# Patient Record
Sex: FEMALE | Race: WHITE | NOT HISPANIC OR LATINO | Employment: OTHER | URBAN - METROPOLITAN AREA
[De-identification: names, ages, dates, MRNs, and addresses within clinical notes are randomized per-mention and may not be internally consistent; named-entity substitution may affect disease eponyms.]

---

## 2018-02-17 ENCOUNTER — HOSPITAL ENCOUNTER (EMERGENCY)
Facility: CLINIC | Age: 48
Discharge: HOME | End: 2018-02-17
Attending: EMERGENCY MEDICINE | Admitting: EMERGENCY MEDICINE
Payer: COMMERCIAL

## 2018-02-17 VITALS
BODY MASS INDEX: 25.71 KG/M2 | TEMPERATURE: 98.1 F | HEIGHT: 65 IN | HEART RATE: 88 BPM | RESPIRATION RATE: 14 BRPM | OXYGEN SATURATION: 98 % | SYSTOLIC BLOOD PRESSURE: 152 MMHG | WEIGHT: 154.32 LBS | DIASTOLIC BLOOD PRESSURE: 91 MMHG

## 2018-02-17 VITALS
HEART RATE: 98 BPM | RESPIRATION RATE: 16 BRPM | HEIGHT: 65 IN | TEMPERATURE: 98.3 F | OXYGEN SATURATION: 99 % | BODY MASS INDEX: 25.83 KG/M2 | DIASTOLIC BLOOD PRESSURE: 97 MMHG | SYSTOLIC BLOOD PRESSURE: 143 MMHG | WEIGHT: 155 LBS

## 2018-02-17 DIAGNOSIS — T15.01XD CORNEAL FOREIGN BODY WITH RESIDUAL MATERIAL, RIGHT, SUBSEQUENT ENCOUNTER: ICD-10-CM

## 2018-02-17 DIAGNOSIS — T15.01XS: ICD-10-CM

## 2018-02-17 PROCEDURE — 25000125 ZZHC RX 250: Performed by: EMERGENCY MEDICINE

## 2018-02-17 PROCEDURE — 99283 EMERGENCY DEPT VISIT LOW MDM: CPT | Mod: 27

## 2018-02-17 PROCEDURE — 25000132 ZZH RX MED GY IP 250 OP 250 PS 637: Performed by: EMERGENCY MEDICINE

## 2018-02-17 PROCEDURE — 99283 EMERGENCY DEPT VISIT LOW MDM: CPT

## 2018-02-17 PROCEDURE — 25000125 ZZHC RX 250

## 2018-02-17 RX ORDER — PROPARACAINE HYDROCHLORIDE 5 MG/ML
SOLUTION/ DROPS OPHTHALMIC
Status: COMPLETED
Start: 2018-02-17 | End: 2018-02-17

## 2018-02-17 RX ORDER — OXYCODONE AND ACETAMINOPHEN 5; 325 MG/1; MG/1
1 TABLET ORAL ONCE
Status: COMPLETED | OUTPATIENT
Start: 2018-02-17 | End: 2018-02-17

## 2018-02-17 RX ORDER — PROPARACAINE HYDROCHLORIDE 5 MG/ML
1 SOLUTION/ DROPS OPHTHALMIC ONCE
Status: COMPLETED | OUTPATIENT
Start: 2018-02-17 | End: 2018-02-17

## 2018-02-17 RX ORDER — OXYCODONE AND ACETAMINOPHEN 5; 325 MG/1; MG/1
1-2 TABLET ORAL EVERY 4 HOURS PRN
Qty: 20 TABLET | Refills: 0 | Status: SHIPPED | OUTPATIENT
Start: 2018-02-17

## 2018-02-17 RX ORDER — DICLOFENAC SODIUM 1 MG/ML
SOLUTION/ DROPS OPHTHALMIC
Qty: 10 ML | Refills: 0 | Status: SHIPPED | OUTPATIENT
Start: 2018-02-17

## 2018-02-17 RX ORDER — NEOMYCIN SULFATE, POLYMYXIN B SULFATE AND DEXAMETHASONE 3.5; 10000; 1 MG/ML; [USP'U]/ML; MG/ML
1 SUSPENSION/ DROPS OPHTHALMIC 4 TIMES DAILY
Qty: 5 ML | Refills: 0 | Status: SHIPPED | OUTPATIENT
Start: 2018-02-17 | End: 2018-02-22

## 2018-02-17 RX ORDER — HYDROCODONE BITARTRATE AND ACETAMINOPHEN 5; 325 MG/1; MG/1
1 TABLET ORAL EVERY 8 HOURS PRN
Qty: 12 TABLET | Refills: 0 | Status: SHIPPED | OUTPATIENT
Start: 2018-02-17

## 2018-02-17 RX ORDER — PROPARACAINE HYDROCHLORIDE 5 MG/ML
2 SOLUTION/ DROPS OPHTHALMIC ONCE
Status: COMPLETED | OUTPATIENT
Start: 2018-02-17 | End: 2018-02-17

## 2018-02-17 RX ADMIN — PROPARACAINE HYDROCHLORIDE 1 DROP: 5 SOLUTION/ DROPS OPHTHALMIC at 17:38

## 2018-02-17 RX ADMIN — OXYCODONE HYDROCHLORIDE AND ACETAMINOPHEN 1 TABLET: 5; 325 TABLET ORAL at 17:38

## 2018-02-17 RX ADMIN — PROPARACAINE HYDROCHLORIDE 2 DROP: 5 SOLUTION/ DROPS OPHTHALMIC at 12:43

## 2018-02-17 ASSESSMENT — ENCOUNTER SYMPTOMS
EYE DISCHARGE: 1
EYE ITCHING: 1
EYE PAIN: 1
PHOTOPHOBIA: 1
EYE PAIN: 1

## 2018-02-17 NOTE — DISCHARGE INSTRUCTIONS
Foreign Object in the Cornea    Your cornea is the clear layer on the front of your eyeball. It focuses light and helps protect your eye from dust and germs. A foreign object can get into the cornea itself. A trapped speck of dirt or grit is often a minor problem. But anything metal, or an object that goes through (pierces) your cornea, can cause severe damage. For example, the cornea can be damages from foreign bodies that occur while grinding metal. The small pieces of metal travel towards the eye at high speed.  When to go to the emergency room (ER)  The longer you wait, the greater the chance of injury or infection. Seek emergency medical help right away for any of the following:    An object in your eye that you can't flush out with water    Your eye remains very swollen or painful after an object has been removed    An object embedded in your eye--cover both eyes with a sterile compress and call 911    The front of your eye (cornea) is white or hazy    Blood in your eye (hyphema)    You are having trouble seeing  What to expect in the ER    A healthcare provider will ask about your injury and examine your eye.    You may be given eye drops to ease any mild pain.    The provider will use a microscope with a bright light to help examine your eyeball. He or she may put a special dye (fluorescein dye) on the cornea to help see the object more clearly.    The provider may remove a loose foreign object.    Severe injuries are likely to be treated by an eye specialist (ophthalmologist).    Antibiotic eye drops and possibly pain medicine will be prescribed if you are discharged home  Follow-up  Call your healthcare provider if you notice any of these symptoms after going home:    Fever of 100.4 F (38 C) or higher, or as directed by your healthcare provider    Increased redness or eye pain    Drainage from your eye    Blurred or decreased vision  Date Last Reviewed: 5/1/2017 2000-2017 [employer]  [address] [place] [address]. All rights reserved. This information is not intended as a substitute for professional medical care. Always follow your healthcare professional's instructions.

## 2018-02-17 NOTE — ED AVS SNAPSHOT
Emergency Department    [address] [place] [address]    Phone:  [phone]    Fax:  [phone]                                       Naz Salazar   MRN: [id number]    Department:   Emergency Department   Date of Visit:  2/17/2018           After Visit Summary Signature Page     I have received my discharge instructions, and my questions have been answered. I have discussed any challenges I see with this plan with the nurse or doctor.    ..........................................................................................................................................  Patient/Patient Representative Signature      ..........................................................................................................................................  Patient Representative Print Name and Relationship to Patient    ..................................................               ................................................  Date                                            Time    ..........................................................................................................................................  Reviewed by Signature/Title    ...................................................              ..............................................  Date                                                            Time

## 2018-02-17 NOTE — ED AVS SNAPSHOT
Emergency Department    [address] [facility] [address]    Phone:  [phone]    Fax:  [phone]                                       Naz Salazar   MRN: [id number]    Department:   Emergency Department   Date of Visit:  2/17/2018           Patient Information     Date Of Birth          1970        Your diagnoses for this visit were:     Corneal foreign body with residual material, right, subsequent encounter        You were seen by Idania Rasheed MD.      Follow-up Information     Follow up with Follow-up with the Eye doctor as previously arranged.        Follow up with  Emergency Department.    Specialty:  EMERGENCY MEDICINE    Why:  As needed    Contact information:    [address] [facility] [address] [phone]        Discharge Instructions         Foreign Object in the Cornea    Your cornea is the clear layer on the front of your eyeball. It focuses light and helps protect your eye from dust and germs. A foreign object can get into the cornea itself. A trapped speck of dirt or grit is often a minor problem. But anything metal, or an object that goes through (pierces) your cornea, can cause severe damage. For example, the cornea can be damages from foreign bodies that occur while grinding metal. The small pieces of metal travel towards the eye at high speed.  When to go to the emergency room (ER)  The longer you wait, the greater the chance of injury or infection. Seek emergency medical help right away for any of the following:    An object in your eye that you can't flush out with water    Your eye remains very swollen or painful after an object has been removed    An object embedded in your eye--cover both eyes with a sterile compress and call 911    The front of your eye (cornea) is white or hazy    Blood in your eye (hyphema)    You are having trouble seeing  What to expect in the ER    A healthcare provider will ask about your injury  and examine your eye.    You may be given eye drops to ease any mild pain.    The provider will use a microscope with a bright light to help examine your eyeball. He or she may put a special dye (fluorescein dye) on the cornea to help see the object more clearly.    The provider may remove a loose foreign object.    Severe injuries are likely to be treated by an eye specialist (ophthalmologist).    Antibiotic eye drops and possibly pain medicine will be prescribed if you are discharged home  Follow-up  Call your healthcare provider if you notice any of these symptoms after going home:    Fever of 100.4 F (38 C) or higher, or as directed by your healthcare provider    Significant increase in redness or eye pain    Thick white drainage from your eye    Blurred or decreasing vision compared to your visits today  Date Last Reviewed: 5/1/2017 2000-2017 [employer]. [address] [place] [address]. All rights reserved. This information is not intended as a substitute for professional medical care. Always follow your healthcare professional's instructions.          24 Hour Appointment Hotline       To make an appointment at any [facility], call [phone] ([phone]). If you don't have a family doctor or clinic, we will help you find one. [facility] are conveniently located to serve the needs of you and your family.             Review of your medicines      START taking        Dose / Directions Last dose taken    oxyCODONE-acetaminophen 5-325 MG per tablet   Commonly known as:  PERCOCET   Dose:  1-2 tablet   Quantity:  20 tablet        Take 1-2 tablets by mouth every 4 hours as needed for pain   Refills:  0          Our records show that you are taking the medicines listed below. If these are incorrect, please call your family doctor or clinic.        Dose / Directions Last dose taken    artificial tears Oint ophthalmic ointment   Dose:  1 inch   Quantity:  10 g        Place 1  g into the right eye 3 times daily for 5 days   Refills:  0        diclofenac 0.1 % ophthalmic solution   Commonly known as:  VOLTAREN   Quantity:  10 mL        Use one drop in affected eye up to four times a day   Refills:  0        HYDROcodone-acetaminophen 5-325 MG per tablet   Commonly known as:  NORCO   Dose:  1 tablet   Quantity:  12 tablet        Take 1 tablet by mouth every 8 hours as needed for moderate to severe pain   Refills:  0        neomycin-polymyxin-dexamethasone 3.5-76193-7.1 Susp ophthalmic susp   Commonly known as:  MAXITROL   Dose:  1 drop   Quantity:  5 mL        Place 1 drop into the right eye 4 times daily for 5 days   Refills:  0                Prescriptions were sent or printed at these locations (1 Prescription)                   Other Prescriptions                Printed at Department/Unit printer (1 of 1)         oxyCODONE-acetaminophen (PERCOCET) 5-325 MG per tablet                Orders Needing Specimen Collection     None      Pending Results     No orders found from 2/15/2018 to 2/18/2018.            Pending Culture Results     No orders found from 2/15/2018 to 2/18/2018.            Pending Results Instructions     If you had any lab results that were not finalized at the time of your Discharge, you can call the ED Lab Result RN at [phone]. You will be contacted by this team for any positive Lab results or changes in treatment. The nurses are available 7 days a week from 10A to 6:30P.  You can leave a message 24 hours per day and they will return your call.        Test Results From Your Hospital Stay               Clinical Quality Measure: Blood Pressure Screening     Your blood pressure was checked while you were in the emergency department today. The last reading we obtained was  BP: (!) 152/91 . Please read the guidelines below about what these numbers mean and what you should do about them.  If your systolic blood pressure (the top number) is less than 120 and your diastolic  "blood pressure (the bottom number) is less than 80, then your blood pressure is normal. There is nothing more that you need to do about it.  If your systolic blood pressure (the top number) is 120-139 or your diastolic blood pressure (the bottom number) is 80-89, your blood pressure may be higher than it should be. You should have your blood pressure rechecked within a year by a primary care provider.  If your systolic blood pressure (the top number) is 140 or greater or your diastolic blood pressure (the bottom number) is 90 or greater, you may have high blood pressure. High blood pressure is treatable, but if left untreated over time it can put you at risk for heart attack, stroke, or kidney failure. You should have your blood pressure rechecked by a primary care provider within the next 4 weeks.  If your provider in the emergency department today gave you specific instructions to follow-up with your doctor or provider even sooner than that, you should follow that instruction and not wait for up to 4 weeks for your follow-up visit.        Thank you for choosing [facility]       Thank you for choosing [facility] for your care. Our goal is always to provide you with excellent care. Hearing back from our patients is one way we can continue to improve our services. Please take a few minutes to complete the written survey that you may receive in the mail after you visit with us. Thank you!        [employer] Information     [employer] lets you send messages to your doctor, view your test results, renew your prescriptions, schedule appointments and more. To sign up, go to [email or web address] . Click on \"Log in\" on the left side of the screen, which will take you to the Welcome page. Then click on \"Sign up Now\" on the right side of the page.     You will be asked to enter the access code listed below, as well as some personal information. Please follow the directions to create your username and password.     Your access code " is: [email or web address]  Expires: 2018  2:10 PM     Your access code will  in 90 days. If you need help or a new code, please call your [facility] clinic or [phone].        Care EveryWhere ID     This is your Care EveryWhere ID. This could be used by other organizations to access your [facility] medical records  [id number]        Equal Access to Services     [facility] KONSTANTIN : Hadii liliana regano Sojade, waaxda luqadaha, qaybta kaalmada adeegyada, farheen gimenez . So [facility] [phone].    ATENCIÓN: Si habla rosyañol, tiene a adorno disposición servicios gratuitos de asistencia lingüística. Llame al [phone].    We comply with applicable federal civil rights laws and [place] laws. We do not discriminate on the basis of race, color, national origin, age, disability, sex, sexual orientation, or gender identity.            After Visit Summary       This is your record. Keep this with you and show to your community pharmacist(s) and doctor(s) at your next visit.

## 2018-02-17 NOTE — ED PROVIDER NOTES
"  History     Chief Complaint:  Eye Problem     HPI   Naz Salazar is a 47 year old female who presents to the emergency department today for evaluation of an eye problem. Per chart review, the patient was seen and evaluated in the ED a few hours ago after accidentally getting eyelash glue in her right eye. While in the ED, a good amount of glue was removed from the eye, but there was still some left in the cornea. The patient was discharged to home with prescription for norco, abx gtts, Voltaren, and artificial tears and recommendation to follow up with opthalmology. Of note she does not wear contacts. However, her eye pain has been excruciating and can \"feel something move in there,\" therefore, prompting the return to the ED for further evaluation. Patient describes it as a \"shard of glass\" in her eye and is unable to open her right eye. When she opens her eye, she is photophobic. The patient has no other concerns at this time.     Allergies:  Sulfa Drugs     Medications:    artificial tears OINT ophthalmic ointment  diclofenac (VOLTAREN) 0.1 % ophthalmic solution  neomycin-polymyxin-dexamethasone (MAXITROL) 3.5-77357-0.1 SUSP ophthalmic susp  HYDROcodone-acetaminophen (NORCO) 5-325 MG per tablet    Past Medical History:    History reviewed. No pertinent past medical history.    Past Surgical History:   History reviewed. No pertinent surgical history.    Family History:    History reviewed. No pertinent family history.     Social History:  The patient was accompanied to the ED by her .  Smoking Status: Current  Smokeless Tobacco: Never  Alcohol Use: Yes  Marital Status:        Review of Systems   Eyes: Positive for photophobia and pain (right).   All other systems reviewed and are negative.    Physical Exam     Patient Vitals for the past 24 hrs:   BP Temp Temp src Pulse Heart Rate Resp SpO2 Height Weight   02/17/18 1705 (!) 152/91 98.1  F (36.7  C) Oral 88 88 14 98 % 1.651 m (5' 5\") 70 kg (154 " lb 5.2 oz)       Physical Exam  General/Appearance: appears stated age, well-groomed, appears very uncomfortable and holding R eye closed with lots of tearing from R eye  Eyes: EOMI, diffuse R scleral injection and tearing, 1cm ulcer vs FB covering majority of R iris with + fluorscein uptake but without other areas of fluorescein uptake, no FB on lid eversion, PERRL, no photophobia once proparacaine in eye  ENT: MMM  Neck: supple, nl ROM, no stiffness  Skin: warm and well-perfused, no rash, no edema, no ecchymosis, nl turgor  Neuro: GCS 15, alert and oriented, no gross focal neuro deficits    Emergency Department Course     Interventions:  1738 Proparacaine 1 Drop Right Eye  1738 Percocet 5-325mg 1 Tablet PO    Emergency Department Course:  Nursing notes and vitals reviewed.  1720: I performed an exam of the patient as documented above.   Findings and plan explained to the Patient and spouse. Patient discharged home with instructions regarding supportive care, medications, and reasons to return. The importance of close follow-up was reviewed. The patient was prescribed percocet.     Impression & Plan      Medical Decision Making:  This patient is a 47-year-old female who is re-presenting today for continued and increasing pain to her right eye.  She had last clue in her eye earlier today.  Part of it was removed but clinically she still has either a large amount overlying the majority of her iris are also corneal ulceration.  She did endorse photophobia however once I was actually numbed this was minimal.  I do not think the cycloplegics would help significantly.  She also complained of a foreign body sensation.  I definitely expect that the glue is creating scraping on the eyelid, thus creating a moving foreign body sensation.  Reflow are seen stained with eyelid eversion was unable to identify foreign body.  I will add an increased dose of narcotics.  She already was given information for ophthalmology  follow-up.    Diagnosis:    ICD-10-CM    1. Corneal foreign body with residual material, right, subsequent encounter T15.01XD      Disposition:  Discharged to home    Discharge Medications:  New Prescriptions    OXYCODONE-ACETAMINOPHEN (PERCOCET) 5-325 MG PER TABLET    Take 1-2 tablets by mouth every 4 hours as needed for pain       Scribe Disclosure:  I, Shelli Lobo, am serving as a scribe at 5:17 PM on 2/17/2018 to document services personally performed by Idania Rasheed MD based on my observations and the provider's statements to me.     2/17/2018    EMERGENCY DEPARTMENT       Idania Rasheed MD  02/17/18 2004

## 2018-02-17 NOTE — DISCHARGE INSTRUCTIONS
Foreign Object in the Cornea    Your cornea is the clear layer on the front of your eyeball. It focuses light and helps protect your eye from dust and germs. A foreign object can get into the cornea itself. A trapped speck of dirt or grit is often a minor problem. But anything metal, or an object that goes through (pierces) your cornea, can cause severe damage. For example, the cornea can be damages from foreign bodies that occur while grinding metal. The small pieces of metal travel towards the eye at high speed.  When to go to the emergency room (ER)  The longer you wait, the greater the chance of injury or infection. Seek emergency medical help right away for any of the following:    An object in your eye that you can't flush out with water    Your eye remains very swollen or painful after an object has been removed    An object embedded in your eye--cover both eyes with a sterile compress and call 911    The front of your eye (cornea) is white or hazy    Blood in your eye (hyphema)    You are having trouble seeing  What to expect in the ER    A healthcare provider will ask about your injury and examine your eye.    You may be given eye drops to ease any mild pain.    The provider will use a microscope with a bright light to help examine your eyeball. He or she may put a special dye (fluorescein dye) on the cornea to help see the object more clearly.    The provider may remove a loose foreign object.    Severe injuries are likely to be treated by an eye specialist (ophthalmologist).    Antibiotic eye drops and possibly pain medicine will be prescribed if you are discharged home  Follow-up  Call your healthcare provider if you notice any of these symptoms after going home:    Fever of 100.4 F (38 C) or higher, or as directed by your healthcare provider    Significant increase in redness or eye pain    Thick white drainage from your eye    Blurred or decreasing vision compared to your visits today  Date Last  Reviewed: 5/1/2017 2000-2017 [employer]. [address] [place] [address], [place] [address]. All rights reserved. This information is not intended as a substitute for professional medical care. Always follow your healthcare professional's instructions.

## 2018-02-17 NOTE — ED AVS SNAPSHOT
Emergency Department    [address] [place] [address]    Phone:  [phone]    Fax:  [phone]                                       Naz Salazar   MRN: [id number]    Department:   Emergency Department   Date of Visit:  2/17/2018           After Visit Summary Signature Page     I have received my discharge instructions, and my questions have been answered. I have discussed any challenges I see with this plan with the nurse or doctor.    ..........................................................................................................................................  Patient/Patient Representative Signature      ..........................................................................................................................................  Patient Representative Print Name and Relationship to Patient    ..................................................               ................................................  Date                                            Time    ..........................................................................................................................................  Reviewed by Signature/Title    ...................................................              ..............................................  Date                                                            Time

## 2018-02-17 NOTE — ED NOTES
Bed: ED10  Expected date:   Expected time:   Means of arrival:   Comments:  Triage--super glue in right eye

## 2018-02-17 NOTE — ED PROVIDER NOTES
"  History     Chief Complaint:  Right eye pain    HPI   Naz Salazar is a 47 year old female who presents with right eye pain. The patient states that today she was applying artificial eyelashes that were being applied using an adhesive. She states that she went to fan herself with the hand holding the lashes when one got loose and became lodged into her right eye. She tried to flush the eye without relief and was unable to remove the lash or adhesive prompting her visit here today for evaluation. She denies any vision loss but is unable to keep her eye open due to irritation.     Allergies:  Sulfa Drugs      Medications:    The patient is currently on no regular medications.     Past Medical History:    The patient does not have any past pertinent medical history.     Past Surgical History:    History reviewed. No pertinent surgical history.     Family History:    History reviewed. No pertinent family history.      Social History:  Smoking Status: Current Smoker  Alcohol Use: yes     Review of Systems   Eyes: Positive for pain, discharge and itching. Negative for visual disturbance.       Physical Exam   First Vitals:  BP: (!) 143/97  Pulse: 98  Temp: 98.3  F (36.8  C)  Resp: 16  Height: 165.1 cm (5' 5\")  Weight: 70.3 kg (155 lb)  SpO2: 99 %      Physical Exam    General: Resting comfortably on the gurney  Head:  The scalp, face, and head appear normal  Eyes:  The pupils are normal    There are long artificial eyelashes to the left eye    The artificial eyelashes to the right eye have been removed    There is a chunk of eyelash glue that has been removed by the patient    A small fragment was also noted on the margin of the lower lid    The patient has some residual glue adherent to the cornea inferiorly    This is a very thin layer and is circular in nature.    It is like a drop of water/glue that dried very quickly    It does cover the visual axis    There is a trace uptake of floor seen in this " location.    ENT:    The nose is normal    Ears/pinnae are normal  Neck:  Normal range of motion  Skin:  No rash or lesions noted.  Neuro: Speech is normal and fluent  Psych:  Awake. Alert.  Normal affect.      Appropriate interactions    Emergency Department Course       Procedures      Procedure Note:   Slit Lamp Examination  Performed by:  Dr. Dionicio Padilla  Indication:  Eye pain/discomfort    Proparacaine drops were used to anesthetize the affected eye.  Fluorescein staining was performed.  The eye was inspected under white and blue light, at low and high magnification    Findings:  Eyelids:  Normal  Cornea:  The right cornea reveals very thin persistent glue adherent to the inferior cornea with some irritation.  There is no gross uptake of floor seen as the cornea itself is somewhat covered.  The remainder of the eye exam is normal.  Iris:   Normal  Conjunctiva:  Normal  Anterior Chamber: Normal  Pupil:   Normal    Impression:     Glue adherent in a very thin layer to the inferior cornea.    Procedure note: Dr. Padilla:  The patient had further proparacaine drops placed to the affected eye.  I systematically tried to remove with a wet Q-tip the central portion of the superglue covering the cornea.  I was able to create a reasonable size area in the center of the superglue that the patient can now see through.  There are still areas of superglue adherence to the cornea.  These will have to flake off on their own.  Lacri-Lube will be placed in the eye along with Maxitrol drops and Voltaren drops for pain.  The patient will see Dr. Daniel Wilson from ophthalmology in follow-up.      Interventions:  1243 - Proparacaine drops, right eye  Emergency Department Course:  Past medical records, nursing notes, and vitals reviewed.  1302: I performed an exam of the patient and obtained history, as documented above.  Patient underwent detailed slit lamp exam here.    1400: I discussed the case with Dr. Daniel Kimble of  ophthalmology.    I rechecked the patient. Findings and plan explained to the Patient. Patient discharged home with instructions regarding supportive care, medications, and reasons to return. The importance of close follow-up was reviewed.      Impression & Plan      Medical Decision Making:  This patient accidentally got eyelash glue into her right eye.  A good portion of the glue was able to be removed.  Some still remains on the cornea.  Follow-up will be with ophthalmology.      Diagnosis:    ICD-10-CM    1. Corneal foreign body with residual material, right, sequela T15.01XS        Discharge Medications:   Details   artificial tears OINT ophthalmic ointment Place 1 g into the right eye 3 times daily for 5 days, Disp-10 g, R-0, Local Print      diclofenac (VOLTAREN) 0.1 % ophthalmic solution Use one drop in affected eye up to four times a day, Disp-10 mL, R-0, Local Print      neomycin-polymyxin-dexamethasone (MAXITROL) 3.5-03648-9.1 SUSP ophthalmic susp Place 1 drop into the right eye 4 times daily for 5 days, Disp-5 mL, R-0, Local Print      HYDROcodone-acetaminophen (NORCO) 5-325 MG per tablet Take 1 tablet by mouth every 8 hours as needed for moderate to severe pain, Disp-12 tablet, R-0, Local Print          Mj Mccray  2/17/2018    EMERGENCY DEPARTMENT  I, Mj Mccray, am serving as a scribe at 1:02 PM on 2/17/2018 to document services personally performed by Dionicio Padilla MD based on my observations and the provider's statements to me.       Dionicio Padilla MD  02/17/18 [id number]

## 2018-02-17 NOTE — ED AVS SNAPSHOT
Emergency Department    [address] [facility] [address]    Phone:  [phone]    Fax:  [phone]                                       Naz Salazar   MRN: [id number]    Department:   Emergency Department   Date of Visit:  2/17/2018           Patient Information     Date Of Birth          1970        Your diagnoses for this visit were:     Corneal foreign body with residual material, right, sequela        You were seen by Dionicio Padilla MD.      Follow-up Information     Follow up with Daniel Chan MD. Schedule an appointment as soon as possible for a visit in 2 days.    Specialty:  Ophthalmology    Contact information:    [facility]  [address]  [facility] [address] [phone]          Discharge Instructions         Foreign Object in the Cornea    Your cornea is the clear layer on the front of your eyeball. It focuses light and helps protect your eye from dust and germs. A foreign object can get into the cornea itself. A trapped speck of dirt or grit is often a minor problem. But anything metal, or an object that goes through (pierces) your cornea, can cause severe damage. For example, the cornea can be damages from foreign bodies that occur while grinding metal. The small pieces of metal travel towards the eye at high speed.  When to go to the emergency room (ER)  The longer you wait, the greater the chance of injury or infection. Seek emergency medical help right away for any of the following:    An object in your eye that you can't flush out with water    Your eye remains very swollen or painful after an object has been removed    An object embedded in your eye--cover both eyes with a sterile compress and call 911    The front of your eye (cornea) is white or hazy    Blood in your eye (hyphema)    You are having trouble seeing  What to expect in the ER    A healthcare provider will ask about your injury and examine your eye.    You may be  given eye drops to ease any mild pain.    The provider will use a microscope with a bright light to help examine your eyeball. He or she may put a special dye (fluorescein dye) on the cornea to help see the object more clearly.    The provider may remove a loose foreign object.    Severe injuries are likely to be treated by an eye specialist (ophthalmologist).    Antibiotic eye drops and possibly pain medicine will be prescribed if you are discharged home  Follow-up  Call your healthcare provider if you notice any of these symptoms after going home:    Fever of 100.4 F (38 C) or higher, or as directed by your healthcare provider    Increased redness or eye pain    Drainage from your eye    Blurred or decreased vision  Date Last Reviewed: 5/1/2017 2000-2017 [employer]. [address] [place] [address], [place] [address]. All rights reserved. This information is not intended as a substitute for professional medical care. Always follow your healthcare professional's instructions.          24 Hour Appointment Hotline       To make an appointment at any [facility], call [phone] ([phone]). If you don't have a family doctor or clinic, we will help you find one. [facility] are conveniently located to serve the needs of you and your family.             Review of your medicines      START taking        Dose / Directions Last dose taken    artificial tears Oint ophthalmic ointment   Dose:  1 inch   Quantity:  10 g        Place 1 g into the right eye 3 times daily for 5 days   Refills:  0        diclofenac 0.1 % ophthalmic solution   Commonly known as:  VOLTAREN   Quantity:  10 mL        Use one drop in affected eye up to four times a day   Refills:  0        HYDROcodone-acetaminophen 5-325 MG per tablet   Commonly known as:  NORCO   Dose:  1 tablet   Quantity:  12 tablet        Take 1 tablet by mouth every 8 hours as needed for moderate to severe pain   Refills:  0         neomycin-polymyxin-dexamethasone 3.5-66351-3.1 Susp ophthalmic susp   Commonly known as:  MAXITROL   Dose:  1 drop   Quantity:  5 mL        Place 1 drop into the right eye 4 times daily for 5 days   Refills:  0                Prescriptions were sent or printed at these locations (4 Prescriptions)                   Other Prescriptions                Printed at Department/Unit printer (4 of 4)         artificial tears OINT ophthalmic ointment               diclofenac (VOLTAREN) 0.1 % ophthalmic solution               neomycin-polymyxin-dexamethasone (MAXITROL) 3.5-10039-1.1 SUSP ophthalmic susp               HYDROcodone-acetaminophen (NORCO) 5-325 MG per tablet                Orders Needing Specimen Collection     None      Pending Results     No orders found from 2/15/2018 to 2/18/2018.            Pending Culture Results     No orders found from 2/15/2018 to 2/18/2018.            Pending Results Instructions     If you had any lab results that were not finalized at the time of your Discharge, you can call the ED Lab Result RN at [phone]. You will be contacted by this team for any positive Lab results or changes in treatment. The nurses are available 7 days a week from 10A to 6:30P.  You can leave a message 24 hours per day and they will return your call.        Test Results From Your Hospital Stay               Clinical Quality Measure: Blood Pressure Screening     Your blood pressure was checked while you were in the emergency department today. The last reading we obtained was  BP: (!) 143/97 . Please read the guidelines below about what these numbers mean and what you should do about them.  If your systolic blood pressure (the top number) is less than 120 and your diastolic blood pressure (the bottom number) is less than 80, then your blood pressure is normal. There is nothing more that you need to do about it.  If your systolic blood pressure (the top number) is 120-139 or your diastolic blood pressure (the  "bottom number) is 80-89, your blood pressure may be higher than it should be. You should have your blood pressure rechecked within a year by a primary care provider.  If your systolic blood pressure (the top number) is 140 or greater or your diastolic blood pressure (the bottom number) is 90 or greater, you may have high blood pressure. High blood pressure is treatable, but if left untreated over time it can put you at risk for heart attack, stroke, or kidney failure. You should have your blood pressure rechecked by a primary care provider within the next 4 weeks.  If your provider in the emergency department today gave you specific instructions to follow-up with your doctor or provider even sooner than that, you should follow that instruction and not wait for up to 4 weeks for your follow-up visit.        Thank you for choosing [facility]       Thank you for choosing [facility] for your care. Our goal is always to provide you with excellent care. Hearing back from our patients is one way we can continue to improve our services. Please take a few minutes to complete the written survey that you may receive in the mail after you visit with us. Thank you!        [employer] Information     [employer] lets you send messages to your doctor, view your test results, renew your prescriptions, schedule appointments and more. To sign up, go to [email or web address] . Click on \"Log in\" on the left side of the screen, which will take you to the Welcome page. Then click on \"Sign up Now\" on the right side of the page.     You will be asked to enter the access code listed below, as well as some personal information. Please follow the directions to create your username and password.     Your access code is: [email or web address]  Expires: 2018  2:10 PM     Your access code will  in 90 days. If you need help or a new code, please call your [facility] clinic or [phone].        Care EveryWhere ID     This is your Care EveryWhere ID. This " could be used by other organizations to access your [facility] medical records  [id number]        Equal Access to Services     SONU PRYOR : Hadii liliana Palomares, ponce ortega, farheen witt. So [facility] [phone].    ATENCIÓN: Si habla español, tiene a adorno disposición servicios gratuitos de asistencia lingüística. Llame al [phone].    We comply with applicable federal civil rights laws and [place] laws. We do not discriminate on the basis of race, color, national origin, age, disability, sex, sexual orientation, or gender identity.            After Visit Summary       This is your record. Keep this with you and show to your community pharmacist(s) and doctor(s) at your next visit.

## 2019-04-05 ENCOUNTER — OFFICE VISIT (OUTPATIENT)
Dept: DERMATOLOGY | Facility: CLINIC | Age: 49
End: 2019-04-05
Payer: COMMERCIAL

## 2019-04-05 VITALS — OXYGEN SATURATION: 100 % | HEART RATE: 78 BPM | DIASTOLIC BLOOD PRESSURE: 68 MMHG | SYSTOLIC BLOOD PRESSURE: 126 MMHG

## 2019-04-05 DIAGNOSIS — L71.0 PERIORAL DERMATITIS: Primary | ICD-10-CM

## 2019-04-05 PROCEDURE — 99202 OFFICE O/P NEW SF 15 MIN: CPT | Performed by: PHYSICIAN ASSISTANT

## 2019-04-05 RX ORDER — MINOCYCLINE HYDROCHLORIDE 100 MG/1
100 CAPSULE ORAL DAILY
Qty: 60 CAPSULE | Refills: 1 | Status: SHIPPED | OUTPATIENT
Start: 2019-04-05

## 2019-04-05 NOTE — LETTER
4/5/2019         RE: Naz Salazar  [address] [address]        Dear Colleague,    Thank you for referring your patient, Naz Salazar, to the [facility]. Please see a copy of my visit note below.    HPI:   Naz Salazar is a 48 year old female who presents for evaluation of rash on chin and nose  chief complaint  Location: chin and nose    Condition present for:  awhile.   Previous treatments include: antibiotic cream, steroid gel, and changing make up   Shx: works as an      Review Of Systems  Eyes: negative  Ears/Nose/Throat: negative  Respiratory: No shortness of breath, dyspnea on exertion, cough, or hemoptysis  Cardiovascular: negative  Gastrointestinal: negative  Genitourinary: negative  Musculoskeletal: negative  Neurologic: negative  Psychiatric: negative    This document serves as a record of the services and decisions personally performed and made by Gaby Feliz, MS, PA-C. It was created on her behalf by Jackie Holbrook, a trained medical scribe. The creation of this document is based on the provider's statements to the medical scribe.  Jackie Holbrook 11:46 AM April 5, 2019    PHYSICAL EXAM:    /68   Pulse 78   SpO2 100%   Skin exam performed as follows: Type 2 skin. Mood appropriate  Alert and Oriented X 3. Well developed, well nourished in no distress.  General appearance: Normal  Head including face: Normal  Eyes: conjunctiva and lids: Normal  Mouth: Lips, teeth, gums: Normal  Neck: Normal  Chest-breast/axillae: Normal  Back: Normal  Spleen and liver: Normal  Cardiovascular: Exam of peripheral vascular system by observation for swelling, varicosities, edema: Normal  Genitalia: groin, buttocks: Normal  Extremities: digits/nails (clubbing): Normal  Eccrine and Apocrine glands: Normal  Right upper extremity: Normal  Left upper extremity: Normal  Right lower extremity: Normal  Left lower extremity:  Normal  Skin: Scalp and body hair: See below    1. Papular eruption around mouth and NL folds    ASSESSMENT/PLAN:     1. Perioral dermatitis - advised on diagnosis and treatment options. Has tried antibiotic cream, steroid gel, and changing make up in the past. Discussed PO and topical medications. Discussed metro cream vs minocycline vs prednisone. She would like to proceed with minocycline and metro cream.   --Start  mg BID x 2 months; advised to d/c if HA/dizziness. Advised on GI upset, photosensitivity potential for pigmentation changes.  --Start metrocream BID      Follow-up: 6 weeks/PRN if doing well  CC:   Scribed By: Jackie Holbrook, Medical Scribe    The information in this document, created by the medical scribe for me, accurately reflects the services I personally performed and the decisions made by me. I have reviewed and approved this document for accuracy prior to leaving the patient care area.  April 5, 2019 11:54 AM    Gaby Feilz MS, PAMIN      Again, thank you for allowing me to participate in the care of your patient.        Sincerely,        Gaby Feliz PA-C

## 2019-04-08 ENCOUNTER — TELEPHONE (OUTPATIENT)
Dept: DERMATOLOGY | Facility: CLINIC | Age: 49
End: 2019-04-08

## 2019-04-08 DIAGNOSIS — L71.0 PERIORAL DERMATITIS: Primary | ICD-10-CM

## 2019-04-08 RX ORDER — PREDNISONE 10 MG/1
TABLET ORAL
Qty: 15 TABLET | Refills: 0 | Status: SHIPPED | OUTPATIENT
Start: 2019-04-08

## 2019-04-08 NOTE — TELEPHONE ENCOUNTER
Reason for Call:  Other prescription    Detailed comments: Patient saw Gaby on Friday. She was offered an oral steroid prescription for the rash on her face that she declined. She has decided she would like to try that now if possible.    She prefers [facility] in [place] on [address] [facility] [address]    Phone Number Patient can be reached at: Cell number on file:    Telephone Information:   Mobile [phone]       Best Time: Anytime    Can we leave a detailed message on this number? YES    Call taken on 4/8/2019 at 8:09 AM by Emmie Higginbotham

## 2019-04-08 NOTE — TELEPHONE ENCOUNTER
Called and spoke to patient. Informed patient of the following: provider has sent over the medication she requested. Patient voiced understanding.    JD Marie-BSN  [facility]  [phone]

## 2019-04-12 ENCOUNTER — TELEPHONE (OUTPATIENT)
Dept: DERMATOLOGY | Facility: CLINIC | Age: 49
End: 2019-04-12

## 2019-04-12 DIAGNOSIS — L71.0 PERIORAL DERMATITIS: Primary | ICD-10-CM

## 2019-04-12 RX ORDER — DOXYCYCLINE 100 MG/1
CAPSULE ORAL
Qty: 60 CAPSULE | Refills: 1 | Status: SHIPPED | OUTPATIENT
Start: 2019-04-12

## 2019-04-12 NOTE — TELEPHONE ENCOUNTER
Pt finished Prednisone and condition much improved.  Also using topical.     Experiencing harsh side effects w/ oral Minocycline - dizziness and headache. Should she try another medication; or stop as condition has improved?  If so, pls send new Rx to [facility] [address]  [place]

## 2019-04-12 NOTE — TELEPHONE ENCOUNTER
Called and LM for patient to call back in regards to Rx change and providers notes.    Frank RN-BSN  [facility]  [phone]

## 2019-04-15 NOTE — TELEPHONE ENCOUNTER
Called and LM for patient to call back in regards to Rx change and providers notes.    Frank RN-BSN  [facility]  [phone]

## 2019-04-16 NOTE — TELEPHONE ENCOUNTER
Called and spoke to patient. Advised patient to stop the Minocycline and start the Doxy. Patient stated she picked up the Doxy on Friday and the affected area is almost completely resolved. Advised patient to call back with any other questions/concerns. Patient voiced understanding.    Frank RN-BSN  [facility]  [phone]

## 2020-01-10 ENCOUNTER — OFFICE VISIT (OUTPATIENT)
Dept: DERMATOLOGY | Facility: CLINIC | Age: 50
End: 2020-01-10
Payer: COMMERCIAL

## 2020-01-10 VITALS — OXYGEN SATURATION: 98 % | DIASTOLIC BLOOD PRESSURE: 76 MMHG | SYSTOLIC BLOOD PRESSURE: 126 MMHG | HEART RATE: 76 BPM

## 2020-01-10 DIAGNOSIS — L40.9 PSORIASIS: Primary | ICD-10-CM

## 2020-01-10 PROCEDURE — 99213 OFFICE O/P EST LOW 20 MIN: CPT | Performed by: PHYSICIAN ASSISTANT

## 2020-01-10 RX ORDER — CLOBETASOL PROPIONATE 0.5 MG/G
OINTMENT TOPICAL
Qty: 60 G | Refills: 3 | Status: SHIPPED | OUTPATIENT
Start: 2020-01-10

## 2020-01-10 NOTE — LETTER
"    1/10/2020         RE: Naz Salazar  [address] [address]        Dear Colleague,    Thank you for referring your patient, Naz Salazar, to the [facility]. Please see a copy of my visit note below.    HPI:   Chief complaints: Naz Salazar is a 49 year old female who presents for evaluation of rash on right palm, right ankle and right ear. Rash is very itchy on her right palm.   Condition present for:  \"Come and go\" for about 1 year.   Previous treatments include: clobetasol, antibiotic cream, and mometasone     Social: lives in [place]. Traveling to [facility]. Works as an      Review Of Systems  Eyes: negative  Ears/Nose/Throat: negative  Respiratory: No shortness of breath, dyspnea on exertion, cough, or hemoptysis  Cardiovascular: negative  Gastrointestinal: negative  Genitourinary: negative  Musculoskeletal: negative  Neurologic: negative  Psychiatric: negative  Skin: positive for rash    This document serves as a record of the services and decisions personally performed and made by Gaby Feliz, MS, PA-C. It was created on her behalf by Jackie Holbrook, a trained medical scribe. The creation of this document is based on the provider's statements to the medical scribe.  Jackie Holbrook 1:17 PM January 10, 2020    PHYSICAL EXAM:    /76   Pulse 76   SpO2 98%   Skin exam performed as follows: Type 2 skin. Mood appropriate  Alert and Oriented X 3. Well developed, well nourished in no distress.  General appearance: Normal  Head including face: Normal  Eyes: conjunctiva and lids: Normal  Mouth: Lips, teeth, gums: Normal  Neck: Normal  Chest-breast/axillae: Normal  Back: Normal  Spleen and liver: Normal  Cardiovascular: Exam of peripheral vascular system by observation for swelling, varicosities, edema: Normal  Genitalia: groin, buttocks: Normal  Extremities: digits/nails (clubbing): Normal  Eccrine and Apocrine glands: " Normal  Right upper extremity: Normal  Left upper extremity: Normal  Right lower extremity: Normal  Left lower extremity: Normal  Skin: Scalp and body hair: See below    1. 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 Psoriasis - advised on chronic, inflammatory, autoimmune disorder. Approx 1% TBSA.. Has tried clobetasol, antibiotic cream, and mometasone in the past. Discussed topicals vs NBUVB therapy. After lengthy discussion she would like to proceed with topicals.  --Start clobetasol ointment BID x 1-2 weeks the PRN only  --May use saran wrap occlusion at HS    2. Merredith to follow up with Primary Care provider regarding elevated blood pressure.        Follow-up: 1 month  CC:   Scribed By: Jackie Holbrook, Medical Scribe    The information in this document, created by the medical scribe for me, accurately reflects the services I personally performed and the decisions made by me. I have reviewed and approved this document for accuracy prior to leaving the patient care area.  January 10, 2020 1:27 PM    Gaby Feliz MS, PA-DORIS        Again, thank you for allowing me to participate in the care of your patient.        Sincerely,        Gaby Feliz PA-C

## 2020-01-10 NOTE — PROGRESS NOTES
"HPI:   Chief complaints: Naz Salazar is a 49 year old female who presents for evaluation of rash on right palm, right ankle and right ear. Rash is very itchy on her right palm.   Condition present for:  \"Come and go\" for about 1 year.   Previous treatments include: clobetasol, antibiotic cream, and mometasone     Social: lives in [place]. Traveling to [facility]. Works as an      Review Of Systems  Eyes: negative  Ears/Nose/Throat: negative  Respiratory: No shortness of breath, dyspnea on exertion, cough, or hemoptysis  Cardiovascular: negative  Gastrointestinal: negative  Genitourinary: negative  Musculoskeletal: negative  Neurologic: negative  Psychiatric: negative  Skin: positive for rash    This document serves as a record of the services and decisions personally performed and made by Gaby Feliz, MS, PA-C. It was created on her behalf by Jackie Holbrook, a trained medical scribe. The creation of this document is based on the provider's statements to the medical scribe.  Jackie Holbrook 1:17 PM January 10, 2020    PHYSICAL EXAM:    /76   Pulse 76   SpO2 98%   Skin exam performed as follows: Type 2 skin. Mood appropriate  Alert and Oriented X 3. Well developed, well nourished in no distress.  General appearance: Normal  Head including face: Normal  Eyes: conjunctiva and lids: Normal  Mouth: Lips, teeth, gums: Normal  Neck: Normal  Chest-breast/axillae: Normal  Back: Normal  Spleen and liver: Normal  Cardiovascular: Exam of peripheral vascular system by observation for swelling, varicosities, edema: Normal  Genitalia: groin, buttocks: Normal  Extremities: digits/nails (clubbing): Normal  Eccrine and Apocrine glands: Normal  Right upper extremity: Normal  Left upper extremity: Normal  Right lower extremity: Normal  Left lower extremity: Normal  Skin: Scalp and body hair: See below    1. 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 Psoriasis - " advised on chronic, inflammatory, autoimmune disorder. Approx 1% TBSA.. Has tried clobetasol, antibiotic cream, and mometasone in the past. Discussed topicals vs NBUVB therapy. After lengthy discussion she would like to proceed with topicals.  --Start clobetasol ointment BID x 1-2 weeks the PRN only  --May use saran wrap occlusion at HS    2. Merredith to follow up with Primary Care provider regarding elevated blood pressure.        Follow-up: 1 month  CC:   Scribed By: Jackie Holbrook, Medical Scribe    The information in this document, created by the medical scribe for me, accurately reflects the services I personally performed and the decisions made by me. I have reviewed and approved this document for accuracy prior to leaving the patient care area.  January 10, 2020 1:27 PM    Gaby Feliz MS, PA-C

## 2023-03-16 ENCOUNTER — MEDICAL CORRESPONDENCE (OUTPATIENT)
Dept: HEALTH INFORMATION MANAGEMENT | Facility: CLINIC | Age: 53
End: 2023-03-16
Payer: COMMERCIAL

## 2023-03-16 DIAGNOSIS — R01.1 SYSTOLIC MURMUR: Primary | ICD-10-CM

## 2023-04-05 ENCOUNTER — HOSPITAL ENCOUNTER (OUTPATIENT)
Dept: CARDIOLOGY | Facility: CLINIC | Age: 53
Discharge: HOME | End: 2023-04-05
Attending: FAMILY MEDICINE | Admitting: FAMILY MEDICINE
Payer: COMMERCIAL

## 2023-04-05 DIAGNOSIS — R01.1 SYSTOLIC MURMUR: ICD-10-CM

## 2023-04-05 LAB — LVEF ECHO: NORMAL

## 2023-04-05 PROCEDURE — 93306 TTE W/DOPPLER COMPLETE: CPT

## 2023-04-05 PROCEDURE — 93306 TTE W/DOPPLER COMPLETE: CPT | Mod: 26 | Performed by: INTERNAL MEDICINE

## 2023-07-22 ENCOUNTER — HEALTH MAINTENANCE LETTER (OUTPATIENT)
Age: 53
End: 2023-07-22

## 2024-07-06 ENCOUNTER — HEALTH MAINTENANCE LETTER (OUTPATIENT)
Age: 54
End: 2024-07-06

## 2024-09-14 ENCOUNTER — HEALTH MAINTENANCE LETTER (OUTPATIENT)
Age: 54
End: 2024-09-14

## 2025-06-22 ENCOUNTER — HEALTH MAINTENANCE LETTER (OUTPATIENT)
Age: 55
End: 2025-06-22